# Patient Record
Sex: MALE | Race: WHITE | Employment: FULL TIME | URBAN - METROPOLITAN AREA
[De-identification: names, ages, dates, MRNs, and addresses within clinical notes are randomized per-mention and may not be internally consistent; named-entity substitution may affect disease eponyms.]

---

## 2017-09-21 ENCOUNTER — APPOINTMENT (OUTPATIENT)
Dept: GENERAL RADIOLOGY | Facility: CLINIC | Age: 37
End: 2017-09-21
Attending: EMERGENCY MEDICINE
Payer: COMMERCIAL

## 2017-09-21 ENCOUNTER — HOSPITAL ENCOUNTER (EMERGENCY)
Facility: CLINIC | Age: 37
Discharge: HOME | End: 2017-09-21
Attending: EMERGENCY MEDICINE | Admitting: EMERGENCY MEDICINE
Payer: COMMERCIAL

## 2017-09-21 VITALS
DIASTOLIC BLOOD PRESSURE: 94 MMHG | RESPIRATION RATE: 20 BRPM | OXYGEN SATURATION: 99 % | TEMPERATURE: 98 F | SYSTOLIC BLOOD PRESSURE: 139 MMHG | HEART RATE: 76 BPM

## 2017-09-21 DIAGNOSIS — S22.32XA CLOSED FRACTURE OF ONE RIB OF LEFT SIDE, INITIAL ENCOUNTER: ICD-10-CM
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
## 2017-09-21 PROCEDURE — 99283 EMERGENCY DEPT VISIT LOW MDM: CPT

## 2017-09-21 PROCEDURE — 99284 EMERGENCY DEPT VISIT MOD MDM: CPT | Performed by: EMERGENCY MEDICINE

## 2017-09-21 PROCEDURE — 71101 X-RAY EXAM UNILAT RIBS/CHEST: CPT | Mod: LT

## 2017-09-21 RX ORDER — HYDROCODONE BITARTRATE AND ACETAMINOPHEN 5; 325 MG/1; MG/1
1-2 TABLET ORAL EVERY 4 HOURS PRN
Qty: 15 TABLET | Refills: 0 | Status: SHIPPED | OUTPATIENT
Start: 2017-09-21 | End: 2021-08-10

## 2017-09-21 ASSESSMENT — ENCOUNTER SYMPTOMS
ABDOMINAL PAIN: 0
SHORTNESS OF BREATH: 0
FEVER: 0

## 2017-09-21 NOTE — ED AVS SNAPSHOT
[facility] Emergency Department    [address] [address]    Phone:  [phone]    Fax:  [phone]                                       Fawad Reyes   MRN: [id number]    Department:  [facility] Emergency Department   Date of Visit:  9/21/2017           Patient Information     Date Of Birth          1980        Your diagnoses for this visit were:     Fall from ladder, initial encounter     Closed fracture of one rib of left side, initial encounter        You were seen by Mathieu Lacey MD.        Discharge Instructions       Left 6th rib is broken    Use ibuprofen 400-600 mg up to 4 times per day if needed for pain. Stop if it is causing nausea or abdominal pain.   Add Norco 5-325 (hydrocodone-acetaminophen) 1-2 pills up to every 4 hours if needed for pain. Do not use alcohol, operate machinery, drive, or climb on ladders for 8 hours after taking Norco. Use docusate (100mg) 2 times a day to prevent constipation while on narcotics.        Rib Fracture (Broken Rib)  Your ribs are curved bones in your chest. They help protect your lungs and expand and contract when you breathe. Children's ribs bend easily and can often withstand a blow or fall. But adult ribs are more likely to break (fracture) under stress. Even coughing or a hard sneeze can fracture a rib.    When to go to the Emergency Room (ER)  Although they can be painful, most rib fractures aren't serious. But they often make it hard to cough or breathe deeply. Get medical care right away if you have:    Trouble breathing.    Nausea, vomiting, or stomach pain with a sore or bruised rib.    Pain that worsens over time.    An injury to the chest or stomach.  What to expect in the ER  Here is what will happen in the ER:     A healthcare provider will ask about your injury and examine you carefully.    An X-ray of your chest will likely be taken to show any major damage to ribs and lungs. However, ribs can undergo  small breaks that do not show up on X-rays, even though they still hurt.    You may be given medicine to ease your discomfort.    Rarely, rib fractures can cause a lung to collapse or lead to bleeding in the chest. In these cases, a tube will be inserted into the chest to reinflate the lung or drain the blood.  Follow-up  You are likely to heal in 6 to 8 weeks. Most rib fractures heal on their own with no lasting effects. Call your healthcare provider right away if you notice any of these symptoms:    Increased chest pain    Shortness of breath    Fever    Coughing up blood  Date Last Reviewed: 9/26/2015 2000-2017 [employer] [address] [place] [address], [place] [address]. All rights reserved. This information is not intended as a substitute for professional medical care. Always follow your healthcare professional's instructions.          24 Hour Appointment Hotline       To make an appointment at any [facility], call [phone] ([phone]). If you don't have a family doctor or clinic, we will help you find one. [facility] are conveniently located to serve the needs of you and your family.             Review of your medicines      START taking        Dose / Directions Last dose taken    HYDROcodone-acetaminophen 5-325 MG per tablet   Commonly known as:  NORCO   Dose:  1-2 tablet   Quantity:  15 tablet        Take 1-2 tablets by mouth every 4 hours as needed for moderate to severe pain   Refills:  0          Our records show that you are taking the medicines listed below. If these are incorrect, please call your family doctor or clinic.        Dose / Directions Last dose taken    FLUoxetine 20 MG capsule   Commonly known as:  PROzac   Dose:  20 mg   Quantity:  30 capsule        Take 1 capsule (20 mg) by mouth daily   Refills:  2        propranolol 40 MG tablet   Commonly known as:  INDERAL   Dose:  40 mg   Quantity:  180 tablet        Take 1 tablet by mouth 2 times daily.   Refills:   2                Prescriptions were sent or printed at these locations (1 Prescription)                   Other Prescriptions                Printed at Department/Unit printer (1 of 1)         HYDROcodone-acetaminophen (NORCO) 5-325 MG per tablet                Procedures and tests performed during your visit     Ribs XR, unilat 3 views + PA chest,  left      Orders Needing Specimen Collection     None      Pending Results     Date and Time Order Name Status Description    9/21/2017 1858 Ribs XR, unilat 3 views + PA chest,  left Preliminary             Pending Culture Results     No orders found from 9/19/2017 to 9/22/2017.            Pending Results Instructions     If you had any lab results that were not finalized at the time of your Discharge, you can call the ED Lab Result RN at [phone]. You will be contacted by this team for any positive Lab results or changes in treatment. The nurses are available 7 days a week from 10A to 6:30P.  You can leave a message 24 hours per day and they will return your call.        Test Results From Your Hospital Stay        9/21/2017  7:39 PM      Narrative     RIBS AND CHEST LEFT THREE VIEWS 9/21/2017 7:22 PM     COMPARISON: None    HISTORY: Fall from ladder with left rib pain.    FINDINGS: The cardiac silhouette, pulmonary vasculature, lungs and  pleural spaces are within normal limits.        Impression     IMPRESSION: Clear lungs.                Thank you for choosing [facility]       Thank you for choosing [facility] for your care. Our goal is always to provide you with excellent care. Hearing back from our patients is one way we can continue to improve our services. Please take a few minutes to complete the written survey that you may receive in the mail after you visit with us. Thank you!        [employer] Information     [employer] lets you send messages to your doctor, view your test results, renew your prescriptions, schedule appointments and more. To sign up, go to  [email or web address] . Click on \"Log in\" on the left side of the screen, which will take you to the Welcome page. Then click on \"Sign up Now\" on the right side of the page.     You will be asked to enter the access code listed below, as well as some personal information. Please follow the directions to create your username and password.     Your access code is: [email or web address]  Expires: 2017  7:57 PM     Your access code will  in 90 days. If you need help or a new code, please call your [facility] clinic or [phone].        Care EveryWhere ID     This is your Care EveryWhere ID. This could be used by other organizations to access your [facility] medical records  [id number]        Equal Access to Services     DYLAN [place] : Shannon Maguire, kwaku baron, favian vázquezalian king, nicolás dempsey . So [facility] [phone].    ATENCIÓN: Si habla español, tiene a diaz disposición servicios gratuitos de asistencia lingüística. Alyssa al [phone].    We comply with applicable federal civil rights laws and [place] laws. We do not discriminate on the basis of race, color, national origin, age, disability sex, sexual orientation or gender identity.            After Visit Summary       This is your record. Keep this with you and show to your community pharmacist(s) and doctor(s) at your next visit.                  "

## 2017-09-21 NOTE — ED AVS SNAPSHOT
[facility] Emergency Department    [address] [address]    Phone:  [phone]    Fax:  [phone]                                       Fawad Reyes   MRN: [id number]    Department:  [facility] Emergency Department   Date of Visit:  9/21/2017           After Visit Summary Signature Page     I have received my discharge instructions, and my questions have been answered. I have discussed any challenges I see with this plan with the nurse or doctor.    ..........................................................................................................................................  Patient/Patient Representative Signature      ..........................................................................................................................................  Patient Representative Print Name and Relationship to Patient    ..................................................               ................................................  Date                                            Time    ..........................................................................................................................................  Reviewed by Signature/Title    ...................................................              ..............................................  Date                                                            Time

## 2017-09-22 NOTE — ED NOTES
Pt fell off ladder, approx 12ft off ground.  Denies LOC.  Did not hit head.  Pt states hit left rib cage on ladder.  Pt states pain 7/10.  Pt does not take blood thinners.

## 2017-09-22 NOTE — ED NOTES
Rn reviewed d/c paperwork with patient. patient verbalized understanding.  No further questions or concerns.

## 2017-09-22 NOTE — DISCHARGE INSTRUCTIONS
Left 6th rib is broken    Use ibuprofen 400-600 mg up to 4 times per day if needed for pain. Stop if it is causing nausea or abdominal pain.   Add Norco 5-325 (hydrocodone-acetaminophen) 1-2 pills up to every 4 hours if needed for pain. Do not use alcohol, operate machinery, drive, or climb on ladders for 8 hours after taking Norco. Use docusate (100mg) 2 times a day to prevent constipation while on narcotics.        Rib Fracture (Broken Rib)  Your ribs are curved bones in your chest. They help protect your lungs and expand and contract when you breathe. Children's ribs bend easily and can often withstand a blow or fall. But adult ribs are more likely to break (fracture) under stress. Even coughing or a hard sneeze can fracture a rib.    When to go to the Emergency Room (ER)  Although they can be painful, most rib fractures aren't serious. But they often make it hard to cough or breathe deeply. Get medical care right away if you have:    Trouble breathing.    Nausea, vomiting, or stomach pain with a sore or bruised rib.    Pain that worsens over time.    An injury to the chest or stomach.  What to expect in the ER  Here is what will happen in the ER:     A healthcare provider will ask about your injury and examine you carefully.    An X-ray of your chest will likely be taken to show any major damage to ribs and lungs. However, ribs can undergo small breaks that do not show up on X-rays, even though they still hurt.    You may be given medicine to ease your discomfort.    Rarely, rib fractures can cause a lung to collapse or lead to bleeding in the chest. In these cases, a tube will be inserted into the chest to reinflate the lung or drain the blood.  Follow-up  You are likely to heal in 6 to 8 weeks. Most rib fractures heal on their own with no lasting effects. Call your healthcare provider right away if you notice any of these symptoms:    Increased chest pain    Shortness of breath    Fever    Coughing up  blood  Date Last Reviewed: 9/26/2015 2000-2017 [email or web address]. [address] [place] [address], [place] [address]. All rights reserved. This information is not intended as a substitute for professional medical care. Always follow your healthcare professional's instructions.

## 2017-09-22 NOTE — ED PROVIDER NOTES
History     Chief Complaint   Patient presents with     Fall     no LOC  was painting drove self to ED C collar in place     HPI  Fawad Reyes is a 37 year old male who is otherwise healthy, presenting to the emergency department with his wife after the patient was painting a shed, on a stepladder.  The patient was approximately 7 feet up in the air, when the step ladder subsequently slipped from underneath him, causing him to fall hitting the left side of the chest against the ladder.  He did not hit his head.  There was no loss of consciousness.  Patient complains of moderate severity left-sided chest wall pain.  There is no associated shortness of breath, however he does have increased amounts of pain with deep inspiration.  There is no abdominal pain.  Patient was ambulatory after the injury.  He is not on any blood thinning medications.  No extremity pains.  No vision changes, or other complaints at this time.      I have reviewed the Medications, Allergies, Past Medical and Surgical History, and Social History in the Epic system.         Review of Systems   Constitutional: Negative for fever.   Respiratory: Negative for shortness of breath.    Cardiovascular: Positive for chest pain.   Gastrointestinal: Negative for abdominal pain.   All other systems reviewed and are negative.      Physical Exam   BP: (!) 139/94  Pulse: 76  Heart Rate: 76  Temp: 98  F (36.7  C)  Resp: 20  SpO2: 99 %  Physical Exam  BP (!) 139/94  Pulse 76  Temp 98  F (36.7  C) (Oral)  Resp 20  SpO2 99%  General: alert, interactive, in no apparent distress  Head: atraumatic  Nose: no rhinorrhea or epistaxis  Ears: no external auditory canal discharge or bleeding.    Eyes: Sclera nonicteric. Conjunctiva noninjected.    Mouth: no tonsillar erythema, edema, or exudate  Neck: supple, no palp LAD  Lungs: CTAB  CV: RRR, S1/S2; peripheral pulses palpable and symmetric.  Tenderness to the left chest wall and approximately the T5 level,  with abrasion which is present just inferior and lateral to the left nipple.  There is tenderness specifically at this location.  Abdomen: soft, nt, nd, no guarding or rebound. Positive bowel sounds  Extremities: no cyanosis or edema  Skin: no rash or diaphoresis  Neuro:   strength 5/5 in UE and LEs bilaterally, sensation intact to light touch in UE and LEs bilaterally;       ED Course     ED Course     Procedures             Critical Care time:  none       Trauma Summary Disposition     Patient is trauma admission:  Trauma  Evaluation    Spine  Backboard removal time: Backboard not applied   C-collar and immobilization: applied in ED on initial evaluation.  CSpine Clearance: C spine cleared clinically  Full Primary and Secondary survey with appropriate immobilization of spine completed in exam section.     Neuro  GCS at arrival:  Motor 6=Obeys commands   Verbal 5=Oriented   Eye Opening 4=Spontaneous   Total: 15     GCS at disposition: unchanged    ED Procedures completed  none                Labs Ordered and Resulted from Time of ED Arrival Up to the Time of Departure from the ED - No data to display  Results for orders placed or performed during the hospital encounter of 09/21/17 (from the past 24 hour(s))   Ribs XR, unilat 3 views + PA chest,  left    Narrative    RIBS AND CHEST LEFT THREE VIEWS 9/21/2017 7:22 PM     COMPARISON: None    HISTORY: Fall from ladder with left rib pain.    FINDINGS: There is a nondisplaced fracture of the anterolateral aspect  of the left sixth rib. No other fractures noted. The cardiac  silhouette, pulmonary vasculature, lungs and pleural spaces are within  normal limits.      Impression    IMPRESSION: Clear lungs. Nondisplaced left sixth rib fracture.    IRINA DENISE MD        Assessments & Plan (with Medical Decision Making)  37 year old male presenting to the emergency department as trauma evaluation after the patient had a fall from approximately 7 feet height on a ladder.  He  had left chest wall against the ladder.  He did not hit his head.  No loss of consciousness.  Neurologic exam is completely normal.  Does not take any daily medications.  Only complaint is left-sided chest wall pain.  This is concerning for rib fracture.  Plain films are obtained.  He is with normal vitals, normal oxygen saturations, normal heart rates.    X-ray images show nondisplaced left 6th rib fracture.  No other rib fractures are noted.  No evidence of pneumothorax.  Patient had actually continued painting for approximately 45 minutes after he had fallen.  Patient will be sent home with Vicodin as needed for pain.  Encouraged on frequent ibuprofen.  Return if severe worsening of symptoms including worsening shortness of breath.  Patient is comfortable with this plan.       I have reviewed the nursing notes.    I have reviewed the findings, diagnosis, plan and need for follow up with the patient.       Discharge Medication List as of 9/21/2017  7:58 PM      START taking these medications    Details   HYDROcodone-acetaminophen (NORCO) 5-325 MG per tablet Take 1-2 tablets by mouth every 4 hours as needed for moderate to severe pain, Disp-15 tablet, R-0, Local Print             Final diagnoses:   Fall from ladder, initial encounter   Closed fracture of one rib of left side, initial encounter       9/21/2017   [facility] EMERGENCY DEPARTMENT     Mathieu Lacey MD  09/21/17 2019

## 2020-01-22 ENCOUNTER — RECORDS - HEALTHEAST (OUTPATIENT)
Dept: LAB | Facility: CLINIC | Age: 40
End: 2020-01-22

## 2020-01-22 LAB
CHOLEST SERPL-MCNC: 211 MG/DL
FASTING STATUS PATIENT QL REPORTED: ABNORMAL
HDLC SERPL-MCNC: 41 MG/DL
LDLC SERPL CALC-MCNC: 118 MG/DL
TRIGL SERPL-MCNC: 262 MG/DL

## 2021-08-10 ENCOUNTER — APPOINTMENT (OUTPATIENT)
Dept: CT IMAGING | Facility: CLINIC | Age: 41
End: 2021-08-10
Attending: PHYSICIAN ASSISTANT
Payer: COMMERCIAL

## 2021-08-10 ENCOUNTER — APPOINTMENT (OUTPATIENT)
Dept: GENERAL RADIOLOGY | Facility: CLINIC | Age: 41
End: 2021-08-10
Attending: EMERGENCY MEDICINE
Payer: COMMERCIAL

## 2021-08-10 ENCOUNTER — HOSPITAL ENCOUNTER (EMERGENCY)
Facility: CLINIC | Age: 41
Discharge: HOME | End: 2021-08-10
Attending: PHYSICIAN ASSISTANT | Admitting: PHYSICIAN ASSISTANT
Payer: COMMERCIAL

## 2021-08-10 VITALS
BODY MASS INDEX: 27.17 KG/M2 | HEIGHT: 73 IN | OXYGEN SATURATION: 92 % | TEMPERATURE: 97.5 F | WEIGHT: 205 LBS | SYSTOLIC BLOOD PRESSURE: 116 MMHG | DIASTOLIC BLOOD PRESSURE: 81 MMHG | RESPIRATION RATE: 16 BRPM | HEART RATE: 79 BPM

## 2021-08-10 DIAGNOSIS — U07.1 2019 NOVEL CORONAVIRUS DISEASE (COVID-19): ICD-10-CM

## 2021-08-10 LAB
ANION GAP SERPL CALCULATED.3IONS-SCNC: 6 MMOL/L (ref 3–14)
BASOPHILS # BLD MANUAL: 0 10E3/UL (ref 0–0.2)
BASOPHILS NFR BLD MANUAL: 0 %
BUN SERPL-MCNC: 14 MG/DL (ref 7–30)
CALCIUM SERPL-MCNC: 8.4 MG/DL (ref 8.5–10.1)
CHLORIDE BLD-SCNC: 109 MMOL/L (ref 94–109)
CO2 SERPL-SCNC: 27 MMOL/L (ref 20–32)
CREAT SERPL-MCNC: 0.8 MG/DL (ref 0.66–1.25)
D DIMER PPP FEU-MCNC: 0.59 UG/ML FEU (ref 0–0.5)
EOSINOPHIL # BLD MANUAL: 0.1 10E3/UL (ref 0–0.7)
EOSINOPHIL NFR BLD MANUAL: 1 %
ERYTHROCYTE [DISTWIDTH] IN BLOOD BY AUTOMATED COUNT: 12.2 % (ref 10–15)
GFR SERPL CREATININE-BSD FRML MDRD: >90 ML/MIN/1.73M2
GLUCOSE BLD-MCNC: 98 MG/DL (ref 70–99)
HCT VFR BLD AUTO: 41.3 % (ref 40–53)
HGB BLD-MCNC: 14.1 G/DL (ref 13.3–17.7)
HOLD SPECIMEN: NORMAL
LYMPHOCYTES # BLD MANUAL: 1.4 10E3/UL (ref 0.8–5.3)
LYMPHOCYTES NFR BLD MANUAL: 16 %
MCH RBC QN AUTO: 28.5 PG (ref 26.5–33)
MCHC RBC AUTO-ENTMCNC: 34.1 G/DL (ref 31.5–36.5)
MCV RBC AUTO: 84 FL (ref 78–100)
MONOCYTES # BLD MANUAL: 0.8 10E3/UL (ref 0–1.3)
MONOCYTES NFR BLD MANUAL: 9 %
NEUTROPHILS # BLD MANUAL: 6.4 10E3/UL (ref 1.6–8.3)
NEUTROPHILS NFR BLD MANUAL: 74 %
PLAT MORPH BLD: NORMAL
PLATELET # BLD AUTO: 399 10E3/UL (ref 150–450)
POTASSIUM BLD-SCNC: 4 MMOL/L (ref 3.4–5.3)
RBC # BLD AUTO: 4.94 10E6/UL (ref 4.4–5.9)
RBC MORPH BLD: NORMAL
SODIUM SERPL-SCNC: 142 MMOL/L (ref 133–144)
WBC # BLD AUTO: 8.6 10E3/UL (ref 4–11)

## 2021-08-10 PROCEDURE — 99285 EMERGENCY DEPT VISIT HI MDM: CPT | Mod: 25 | Performed by: PHYSICIAN ASSISTANT

## 2021-08-10 PROCEDURE — 250N000011 HC RX IP 250 OP 636: Performed by: PHYSICIAN ASSISTANT

## 2021-08-10 PROCEDURE — 36415 COLL VENOUS BLD VENIPUNCTURE: CPT | Performed by: PHYSICIAN ASSISTANT

## 2021-08-10 PROCEDURE — 80048 BASIC METABOLIC PNL TOTAL CA: CPT | Performed by: PHYSICIAN ASSISTANT

## 2021-08-10 PROCEDURE — 85027 COMPLETE CBC AUTOMATED: CPT | Performed by: PHYSICIAN ASSISTANT

## 2021-08-10 PROCEDURE — 250N000009 HC RX 250: Performed by: PHYSICIAN ASSISTANT

## 2021-08-10 PROCEDURE — 71275 CT ANGIOGRAPHY CHEST: CPT

## 2021-08-10 PROCEDURE — 85379 FIBRIN DEGRADATION QUANT: CPT | Performed by: PHYSICIAN ASSISTANT

## 2021-08-10 PROCEDURE — 99284 EMERGENCY DEPT VISIT MOD MDM: CPT | Performed by: PHYSICIAN ASSISTANT

## 2021-08-10 PROCEDURE — 71045 X-RAY EXAM CHEST 1 VIEW: CPT

## 2021-08-10 RX ORDER — ZINC GLUCONATE 50 MG
50 TABLET ORAL DAILY
COMMUNITY

## 2021-08-10 RX ORDER — CHOLECALCIFEROL (VITAMIN D3) 50 MCG
1 TABLET ORAL DAILY
COMMUNITY

## 2021-08-10 RX ORDER — BENZONATATE 100 MG/1
100-200 CAPSULE ORAL 3 TIMES DAILY PRN
Qty: 42 CAPSULE | Refills: 0 | Status: SHIPPED | OUTPATIENT
Start: 2021-08-10 | End: 2021-09-09

## 2021-08-10 RX ORDER — LEVALBUTEROL TARTRATE 45 UG/1
2 AEROSOL, METERED ORAL EVERY 4 HOURS PRN
Qty: 15 G | Refills: 0 | Status: SHIPPED | OUTPATIENT
Start: 2021-08-10

## 2021-08-10 RX ORDER — MULTIVIT WITH MINERALS/LUTEIN
1000 TABLET ORAL DAILY
COMMUNITY

## 2021-08-10 RX ORDER — IOPAMIDOL 755 MG/ML
80 INJECTION, SOLUTION INTRAVASCULAR ONCE
Status: COMPLETED | OUTPATIENT
Start: 2021-08-10 | End: 2021-08-10

## 2021-08-10 RX ADMIN — IOPAMIDOL 80 ML: 755 INJECTION, SOLUTION INTRAVENOUS at 17:42

## 2021-08-10 RX ADMIN — SODIUM CHLORIDE 100 ML: 9 INJECTION, SOLUTION INTRAVENOUS at 17:43

## 2021-08-10 ASSESSMENT — MIFFLIN-ST. JEOR: SCORE: 1893.75

## 2021-08-10 NOTE — ED PROVIDER NOTES
History     Chief Complaint   Patient presents with     Covid Concern     (+) covid 12 days of sx - here with increased shortness of breath     HPI  Fawad Reyes is a 41 year old male who presents to the urgent care with concerns over 12-day history of cough with shortness of breath.  Patient reports that he initially developed fever up to 101 chills, myalgias, fatigue, headache, decreased appetite, nasal congestion. Those symptoms have since improved however he continues to complain of persistent cough shortness of breath which is exacerbated by any activity/movement.  He denies any loss of taste or smell, sore throat, ear pain, vomiting, diarrhea.  He did test positive for COVID-19 10 days ago.  He has been treating it with vitamins without relief. He denies any history of asthma, COPD or other breathing related disorders.  He is a non-smoker.  He has not been vaccinated for COVID-19.      Allergies:  No Known Allergies    Problem List:    Patient Active Problem List    Diagnosis Date Noted     Anxiety 2013     Priority: Medium     Plantar fasciitis of left foot 2013     Priority: Medium     CARDIOVASCULAR SCREENING; LDL GOAL LESS THAN 160 10/31/2010     Priority: Medium     Migraine headache 2010     Priority: Medium     Failed Imitrex and Maxalt. Headaches 1-4 per week.   1/10 trial Propranolol preventative; trial Midrin for treatment  (Problem list name updated by automated process. Provider to review and confirm.)          Past Medical History:    No past medical history on file.    Past Surgical History:    No past surgical history on file.    Family History:    Family History   Problem Relation Age of Onset     Diabetes Maternal Grandmother      Diabetes Paternal Grandmother      Heart Disease Paternal Grandmother          congestive heart failure age 65     Diabetes Paternal Grandfather        Social History:  Marital Status:  Single [1]  Social History     Tobacco Use      "Smoking status: Never Smoker   Substance Use Topics     Alcohol use: No     Drug use: No        Medications:    FLUoxetine (PROZAC) 20 MG capsule  HYDROcodone-acetaminophen (NORCO) 5-325 MG per tablet  propranolol (INDERAL) 40 MG tablet      Review of Systems  CONSTITUTIONAL:POSITIVE  for resolving fever, chills, allergies, fatigue  INTEGUMENTARY/SKIN: NEGATIVE for worrisome rashes, moles or lesions  EYES: NEGATIVE for vision changes or irritation  ENT/MOUTH: POSITIVE for nasal congestion and NEGATIVE for sore throat, ear pain   RESP:POSITIVE for cough, shortness of breath  GI:  POSITIVE for decreased appetite NEGATIVE for vomiting, diarrhea,   Physical Exam   BP: 124/83  Pulse: 81  Temp: 97.5  F (36.4  C)  Resp: 16  Height: 185.4 cm (6' 1\")  Weight: 93 kg (205 lb)  SpO2: 95 %  Physical Exam  GENERAL APPEARANCE: healthy, alert and no distress  EYES: EOMI,  PERRL, conjunctiva clear  HENT: ear canals and TM's normal.  Nose and mouth without ulcers, erythema or lesions  NECK: supple, nontender, no lymphadenopathy  RESP: lungs clear to auscultation - no rales, rhonchi or wheezes  CV: regular rates and rhythm, normal S1 S2, no murmur noted  SKIN: no suspicious lesions or rashes  ED Course        Procedures       Critical Care time:  none            Results for orders placed or performed during the hospital encounter of 08/10/21   XR Chest Port 1 View     Status: None    Narrative    CHEST ONE VIEW PORTABLE   8/10/2021 4:09 PM     HISTORY: Cough, shortness of breath. COVID positive. Ill for 12 days.    COMPARISON: 9/21/2017      Impression    IMPRESSION: Heart size is normal. No pleural effusion, pneumothorax,  or abnormal area of consolidation. Slight elevation of the right  hemidiaphragm. There may be very subtle peripheral opacities in both  lungs, which may relate to underlying COVID. Film is slightly  underpenetrated, though would suggest radiographic follow-up.    MARCOS PEARSON MD         SYSTEM ID:  [id number]   CT " Chest Pulmonary Embolism w Contrast     Status: None    Narrative    EXAM: CT CHEST PULMONARY EMBOLISM W CONTRAST  LOCATION: [facility]  DATE/TIME: 8/10/2021 5:42 PM    INDICATION: PE suspected, low/intermediate prob, positive D-dimer  COMPARISON: None.  TECHNIQUE: CT chest pulmonary angiogram during arterial phase injection of IV contrast. Multiplanar reformats and MIP reconstructions were performed. Dose reduction techniques were used.   CONTRAST: 80 mL Isovue 370    FINDINGS:  ANGIOGRAM CHEST: No evidence for pulmonary embolism. Pulmonary arteries normal in caliber. Thoracic aorta normal in caliber. No aortic dissection or other acute abnormality.    HEART: Cardiac chambers within normal limits. No pericardial effusion. No coronary artery calcification.    LUNGS AND PLEURA: Moderate predominantly peripheral groundglass density is present within the lungs bilaterally, greatest in the lower lobes. No pleural effusion or pneumothorax.    MEDIASTINUM: No mediastinal adenopathy. Mildly enlarged hilar lymph nodes bilaterally, likely reactive.    LIMITED UPPER ABDOMEN: Hepatic steatosis.    MUSCULOSKELETAL: Negative.      Impression    IMPRESSION:  1.  No evidence for pulmonary embolism.   2.  Moderate pneumonia, appearance compatible with COVID 19 pneumonia.  3.  Hepatic steatosis.   CBC with platelets, differential     Status: None    Narrative    The following orders were created for panel order CBC with platelets, differential.  Procedure                               Abnormality         Status                     ---------                               -----------         ------                     CBC with platelets and d...[[id number]]  Normal              Final result               Manual Differential[[id number]]                              Final result                 Please view results for these tests on the individual orders.   Basic metabolic panel     Status: Abnormal   Result  Value Ref Range    Sodium 142 133 - 144 mmol/L    Potassium 4.0 3.4 - 5.3 mmol/L    Chloride 109 94 - 109 mmol/L    Carbon Dioxide (CO2) 27 20 - 32 mmol/L    Anion Gap 6 3 - 14 mmol/L    Urea Nitrogen 14 7 - 30 mg/dL    Creatinine 0.80 0.66 - 1.25 mg/dL    Calcium 8.4 (L) 8.5 - 10.1 mg/dL    Glucose 98 70 - 99 mg/dL    GFR Estimate >90 >60 mL/min/1.73m2   D dimer quantitative     Status: Abnormal   Result Value Ref Range    D-Dimer Quantitative 0.59 (H) 0.00 - 0.50 ug/mL FEU    Narrative    This D-dimer assay is intended for use in conjunction with a clinical pretest probability assessment model to exclude pulmonary embolism (PE) and deep venous thrombosis (DVT) in outpatients suspected of PE or DVT. The cut-off value is 0.50 ug/mL FEU.   CBC with platelets and differential     Status: Normal   Result Value Ref Range    WBC Count 8.6 4.0 - 11.0 10e3/uL    RBC Count 4.94 4.40 - 5.90 10e6/uL    Hemoglobin 14.1 13.3 - 17.7 g/dL    Hematocrit 41.3 40.0 - 53.0 %    MCV 84 78 - 100 fL    MCH 28.5 26.5 - 33.0 pg    MCHC 34.1 31.5 - 36.5 g/dL    RDW 12.2 10.0 - 15.0 %    Platelet Count 399 150 - 450 10e3/uL   Extra Red Top Tube     Status: None   Result Value Ref Range    Hold Specimen JIC    Extra Green Top (Lithium Heparin) Tube     Status: None   Result Value Ref Range    Hold Specimen JIC    Extra Green Top (Lithium Heparin) Tube     Status: None   Result Value Ref Range    Hold Specimen JIC    Manual Differential     Status: None   Result Value Ref Range    % Neutrophils 74 %    % Lymphocytes 16 %    % Monocytes 9 %    % Eosinophils 1 %    % Basophils 0 %    Absolute Neutrophils 6.4 1.6 - 8.3 10e3/uL    Absolute Lymphocytes 1.4 0.8 - 5.3 10e3/uL    Absolute Monocytes 0.8 0.0 - 1.3 10e3/uL    Absolute Eosinophils 0.1 0.0 - 0.7 10e3/uL    Absolute Basophils 0.0 0.0 - 0.2 10e3/uL    RBC Morphology Confirmed RBC Indices     Platelet Assessment  Automated Count Confirmed. Platelet morphology is normal.     Automated Count  Confirmed. Platelet morphology is normal.   [place] Draw     Status: None    Narrative    The following orders were created for panel order [place] Draw.  Procedure                               Abnormality         Status                     ---------                               -----------         ------                     Extra Red Top Tube[[id number]]                               Final result               Extra Green Top (Lithium...[[id number]]                      Final result               Extra Green Top (Lithium...[[id number]]                      Final result                 Please view results for these tests on the individual orders.     Medications - No data to display    Assessments & Plan (with Medical Decision Making)     I have reviewed the nursing notes.  I have reviewed the findings, diagnosis, plan and need for follow up with the patient.     Discharge Medication List as of 8/10/2021  6:30 PM      START taking these medications    Details   benzonatate (TESSALON) 100 MG capsule Take 1-2 capsules (100-200 mg) by mouth 3 times daily as needed for cough, Disp-42 capsule, R-0, E-Prescribe      levalbuterol (XOPENEX HFA) 45 MCG/ACT inhaler Inhale 2 puffs into the lungs every 4 hours as needed for shortness of breath / dyspnea or wheezing, Disp-15 g, R-0, E-Prescribe           Final diagnoses:   2019 novel coronavirus disease (COVID-19)     41-year-old male with known history of COVID-19 presents to the emergency department with concern over persistent shortness of breath for the last 12 days after his fever chills myalgias fatigue headache at began to improve.  He did have stable vital signs upon arrival with oxygen saturations at 95%.  Physical exam included fine crackles in lower lungs bilaterally, no evidence of respiratory distress.  Patient did have chest x-ray initially which appear consistent with Covid pneumonia.  Given persistent nature of symptoms she also have lab work including elevated  D-dimer 0.59 potentially secondary to his known history of COVID-19 however cannot rule out PE so CT of his chest with contrast was obtained and did not demonstrate evidence of PE however did appear consistent with Covid pneumonia.  He ambulated in the department and oxygen levels remained above 92%.  He is not acutely hypoxic does not require supplemental oxygen and does not meet admission criteria at this time.  He was discharged home stable with instructions for symptomatic treatment trial of albuterol inhaler, Tessalon given for cough control.  Covid get well loop referral was placed and was given home pulse oximeter.  He was instructed to follow-up with primary care provider if no improvement within the next 7 to 10 days or return to the ER sooner if oxygen saturation levels dropped below 90%.  Other signs of respiratory distress, worrisome reasons to seek care sooner discussed.     Disclaimer: This note consists of symbols derived from keyboarding, dictation, and/or voice recognition software. As a result, there may be errors in the script that have gone undetected.  Please consider this when interpreting information found in the chart.    8/10/2021   [facility] EMERGENCY DEPT     Maile Arredondo PA-C  08/11/21 [id number]

## 2021-08-10 NOTE — ED NOTES
Pt presents to ED with concerns of ongoing cough and shortness of breath. States he began having symptoms 12 days ago now. Denies N, V, D. Has been taking Vit C, D and Zinc at home without improvement. Pt notes his cough is occasionally productive and it is brown in color.

## 2025-01-24 ENCOUNTER — LAB REQUISITION (OUTPATIENT)
Dept: LAB | Facility: CLINIC | Age: 45
End: 2025-01-24

## 2025-01-24 DIAGNOSIS — M10.072 IDIOPATHIC GOUT, LEFT ANKLE AND FOOT: ICD-10-CM

## 2025-01-24 PROCEDURE — 84550 ASSAY OF BLOOD/URIC ACID: CPT | Performed by: PHYSICIAN ASSISTANT

## 2025-01-25 LAB — URATE SERPL-MCNC: 8.1 MG/DL (ref 3.4–7)

## 2025-04-15 ENCOUNTER — TRANSCRIBE ORDERS (OUTPATIENT)
Dept: OTHER | Age: 45
End: 2025-04-15

## 2025-04-15 DIAGNOSIS — D48.9 NEOPLASM OF UNCERTAIN BEHAVIOR: Primary | ICD-10-CM
